# Patient Record
Sex: FEMALE | Race: WHITE
[De-identification: names, ages, dates, MRNs, and addresses within clinical notes are randomized per-mention and may not be internally consistent; named-entity substitution may affect disease eponyms.]

---

## 2019-06-19 ENCOUNTER — HOSPITAL ENCOUNTER (EMERGENCY)
Dept: HOSPITAL 25 - UCEAST | Age: 54
Discharge: HOME | End: 2019-06-19
Payer: COMMERCIAL

## 2019-06-19 VITALS — SYSTOLIC BLOOD PRESSURE: 140 MMHG | DIASTOLIC BLOOD PRESSURE: 97 MMHG

## 2019-06-19 DIAGNOSIS — R53.81: ICD-10-CM

## 2019-06-19 DIAGNOSIS — J02.9: Primary | ICD-10-CM

## 2019-06-19 DIAGNOSIS — R50.9: ICD-10-CM

## 2019-06-19 PROCEDURE — 99211 OFF/OP EST MAY X REQ PHY/QHP: CPT

## 2019-06-19 PROCEDURE — G0463 HOSPITAL OUTPT CLINIC VISIT: HCPCS

## 2019-06-19 PROCEDURE — 87651 STREP A DNA AMP PROBE: CPT

## 2019-06-19 NOTE — UC
Throat Pain/Nasal [place] HPI





- HPI Summary


HPI Summary: 





Pt presents with c/o sudden onset of ST, fever, malaise X 2 days. 





- History of Current Complaint


Chief Complaint: UCRespiratory


Stated Complaint: SORE THROAT


Time Seen by Provider: 06/19/19 12:58


Hx Obtained From: Patient


Hx Last Menstrual Period: ablasion 2005


Pregnant?: No


Onset/Duration: Sudden Onset, Lasting Days, Still Present, Worse Since - onset


Severity: Moderate


Pain Intensity: 4


Pain Scale Used: 0-10 Numeric


Cough: None


Associated Signs & Symptoms: Positive: Dysphagia, Fever





- Epiglottits Risk Factors


Epiglottis Risk Factors: Sudden Onset





- Allergies/Home Medications


Allergies/Adverse Reactions: 


 Allergies











Allergy/AdvReac Type Severity Reaction Status Date / Time


 


No Known Allergies Allergy   Verified 06/19/19 12:35











Home Medications: 


 Home Medications





Gabapentin CAP(*) [Neurontin 100 mg CAP(*)] 100 mg PO DAILY 06/19/19 [History 

Confirmed 06/19/19]


Ibuprofen 400 mg PO 06/19/19 [History]











PMH/Surg Hx/FS Hx/Imm Hx


Previously Healthy: Yes


Other History Of: 


   Negative For: HIV, Hepatitis B, Hepatitis C





- Surgical History


Surgical History: Yes


Surgery Procedure, Year, and Place: left LOWER EYELID - COSMETIC.  UTERINE 

ABLATION





- Family History


Known Family History: Positive: Cardiac Disease





- Social History


Occupation: Employed Full-time


Lives: With Family


Alcohol Use: Weekly


Substance Use Type: None


Smoking Status (MU): Never Smoked Tobacco


Have You Smoked in the Last Year: No





- Immunization History


Vaccination Up to Date: Yes





Review of Systems


All Other Systems Reviewed And Are Negative: Yes


Constitutional: Positive: Negative


Skin: Positive: Negative


Eyes: Positive: Negative


ENT: Positive: Sore Throat


Respiratory: Positive: Negative


Cardiovascular: Positive: Negative


Gastrointestinal: Positive: Negative


Genitourinary: Positive: Negative


Motor: Positive: Negative


Neurovascular: Positive: Negative


Musculoskeletal: Positive: Myalgia


Neurological: Positive: Negative


Psychological: Positive: Negative


Is Patient Immunocompromised?: No





Physical Exam


Triage Information Reviewed: Yes


Appearance: Ill-Appearing


Vital Signs: 


 Initial Vital Signs











Temp  100.3 F   06/19/19 12:30


 


Pulse  80   06/19/19 12:30


 


Resp  18   06/19/19 12:30


 


BP  140/97   06/19/19 12:30


 


Pulse Ox  97   06/19/19 12:30











Vital Signs Reviewed: Yes


Eye Exam: Normal


ENT: Positive: Pharyngeal erythema


Dental Exam: Normal


Neck exam: Normal


Respiratory Exam: Normal


Cardiovascular Exam: Normal


Musculoskeletal Exam: Normal


Neurological Exam: Normal


Psychological Exam: Normal


Skin Exam: Normal





Throat Pain/Nasal Course/Dx





- Differential Dx/Diagnosis


Differential Diagnosis/HQI/PQRI: Pharyngitis, Tonsillitis


Provider Diagnosis: 


 Sore throat (viral)








Discharge





- Sign-Out/Discharge


Documenting (check all that apply): Patient Departure


All imaging exams completed and their final reports reviewed: No Studies





- Discharge Plan


Condition: Stable


Disposition: HOME


Patient Education Materials:  Pharyngitis (ED), Viral Syndrome (ED)


Referrals: 


Josiah Painting MD [Primary Care Provider] - If Needed


Additional Instructions: 


Please follow up with your PCP as needed. If your symptoms do not improve or 

they worsen, please seek care at the closest emergency room as soon as possible





- Billing Disposition and Condition


Condition: STABLE


Disposition: Home

## 2019-07-08 ENCOUNTER — HOSPITAL ENCOUNTER (EMERGENCY)
Dept: HOSPITAL 25 - UCEAST | Age: 54
Discharge: HOME | End: 2019-07-08
Payer: COMMERCIAL

## 2019-07-08 VITALS — SYSTOLIC BLOOD PRESSURE: 129 MMHG | DIASTOLIC BLOOD PRESSURE: 82 MMHG

## 2019-07-08 DIAGNOSIS — J01.90: Primary | ICD-10-CM

## 2019-07-08 PROCEDURE — 99212 OFFICE O/P EST SF 10 MIN: CPT

## 2019-07-08 PROCEDURE — G0463 HOSPITAL OUTPT CLINIC VISIT: HCPCS

## 2019-07-08 NOTE — XMS REPORT
Continuity of Care Document (CCD)

 Created on:2019



Patient:Celia Braga

Sex:Female

:1965

External Reference #:[id number]





Demographics







 Address  [address] [place] [address]

 

 Home Phone  [phone]

 

 Mobile Phone  [phone]

 

 Work Phone  [phone]

 

 Email Address  [email or web address]

 

 Preferred Language  en

 

 Marital Status  Not  or 

 

 Anabaptist Affiliation  Unknown

 

 Race  White

 

 Ethnic Group  Not  or 









Author







 Name  Josiah Painting M.D.

 

 Address  [address]  [facility]



   Unavailable



   [place] [address]









Support







 Name  Relationship  Address  Phone

 

 Ryan Braga    Unavailable  Unavailable









Care Team Providers







 Name  Role  Phone

 

 Josiah Painting M.D.  Care Team Information   Unavailable









Payers







 Date  Identification Numbers  Payment Provider  Subscriber

 

 Effective: 2018  Policy Number: [id number]  Luana VILLA/ALYCIA  Ryan Braga









 Group Name: [facility]  [address] [address]

 

 PayID: [id number]  [place] [address]







Problems







 Description

 

 No Active Problems







Family History







 Date  Family Member(s)  Observation  Comments

 

   Father  Alzheimer's Disease  

 

   Mother  No Current Problems  

 

   First Brother  No Current Problems  

 

   First Sister  No Current Problems  







Social History







 Type  Date  Description  Comments

 

 Birth Sex    Unknown  

 

 Marital Status    Has been  1 time  

 

 Lives With    Spouse  

 

 Lives With    Children  2

 

 Occupation      

 

 ETOH Use    Occasionally consumes alcohol  

 

 Tobacco Use  Start: Unknown End:  Patient is a former smoker  



   Unknown    

 

 Recreational Drug Use    Never Used Drugs  

 

 Smoking Status  Reviewed: 19  Patient is a former smoker  

 

 Sun Exposure    Uses sunscreen  

 

 Seat Belt/Car Seat    Always uses seat belt  







Allergies, Adverse Reactions, Alerts







 Active Allergies  Reaction  Severity  Comments  Date

 

 NKDA        2011

 

 Seasonal        2019







Medications







 Active Medications  SIG  Qnty  Indications  Ordering  Date



         Provider  

 

 Gabapentin  1 QHS  90caps    Josiah Painting  2019



          100mg        FERNANDA MARTINI  



 Capsules          



           

 

 Citalopram  Take One Tablet  30tabs  F41.9  Josiah Painting  2019



 Hydrobromide  By Mouth Every      FERNANDA MARTINI  



            20mg  Day        



 Tablets          



           

 

 Symbicort  2 puff twice a  10.200gm    Josiah Painting  2018



   day      FERNANDA MARTINI  



 160-4.5mcg/Act          



 Aerosol          



           

 

 Aerochamber Plus  use chamber  1units    Josiah Painting  2017



                 Misc        FERNANDA MARTINI  



           

 

 Fluticasone  2 sprays to each  16gm  J30.9  Josiah Painting  2017



 Propionate  nostril qd.      FERNANDA MARTINI  



          50mcg/Act          



 Suspension          



           

 

 Proair HFA  2 puffs every 4  1units    Josiah Painting  2017



          108(90Base)  hours as needed      FERNANDA MARTINI  



 mcg/Act Aerosol  wheeze        



           









 History Medications









 Amoxicillin  1 tab by mouth  20tabs  J01.90  Josiah Painting  2018 -



           875mg  twice a day      FERNANDA MARTINI  2018



 Tablets          



           

 

 Prednisone  2 tablets by  qs  R06.2  Josiah Painting  2017 -



          20mg Tablets  mouth for 5      FERNANDA MARTINI  2017



   days        

 

 Prednisone  3 tab by mouth  18tabs    Josiah Painting  2017 -



          20mg Tablets  every day for 3      FERNANAD MARTINI  2017



   days then 2 by        



   mouth daily for        



   3 days then 1        



   by mouth daily        



   for 3 days        

 

 Celexa  take one tablet  30tabs  F41.9  Josiah Painting  2016 -



      20mg Tablets  by mouth every      FERNANDA MARTINI  2019



   day        

 

 Amoxicillin  1 by mouth  30tabs    Josiah Painting  10/14/2016 -



           500mg  three times a      FERNANDA MARTINI  10/24/2016



 Tablets  day        



           

 

 Cipro  1 by mouth  20tabs    Josiah Painting  2016 -



     500mg Tablets  twice a day      FERNANDA MARTINI  2016



           

 

 Cephalexin  1 by mouth  20tabs    Josiah Painting  2016 -



          500mg  twice a day      FERNANDA MARTINI  2016



 Tablets          



           

 

 Citalopram  1 by mouth  30tabs  F41.9  Josiah Painting  2016 -



 Hydrobromide  every day      FERNANDA MARTINI  2016



            20mg          



 Tablets          



           

 

 Trazodone HCL  1/2 at bedtime  30tabs  F41.9  Josiah Painting  2016 -



             150mg        FERNANDA MARTINI  2016



 Tablets          



           

 

 Cymbalta  1 by mouth  30caps  F41.9  Josiah Painting  2016 -



        30mg Caps   every day      FERNANDA MARTINI  2016



 Part          

 

 No Active Medications        Unknown  2016 -



           2016

 

 Ciprodex  3 drops tid  1bottle  380.22  Josiah Painting  2013 -



        0.3-0.1%        FERNANDA MARTINI  2016



 Suspension          



           

 

 Amoxicillin/Potassium  1 tab by mouth  20tabs    Josiah Painting  2013 -



 Clavulanate  twice a day x      FERNANDA MARTINI  2016



           875-125mg  10 days from ER        



 Tablets  [facility]        

 

 Albuterol  2 puffs po prn      Josiah Painting  2013 -



         FERNANDA MARTINI  2016

 

 Meloxicam  1 po qd  30tabs    Josiah Painting  2013 -



         15mg Tablets        FERNANDA MARTINI  2013



           

 

 Anusol-HC  apply tid  45gm  569.42  Josiah Painting  2012 -



         2.5% Cream        FERNANDA MARTINI  2013



           

 

 Ciprofloxacin HCL  1 po bid  6tabs  595.0  Josiah Painting  10/25/2011 -



                 250mg        FERNANDA MARTINI  10/28/2011



 Tablets          



           

 

 Naprosyn  1 po bid  60tabs    Josiah Painting  10/12/2011 -



        500mg Tablets        FERNANDA MARTINI  2012



           

 

 Omeprazole  1 po qd  30caps    Josiah Painting  10/12/2011 -



          20mg        FERNANDA MARTINI  2012



 Capsules DR Lloyd  Take 1 Tablet  30taJosiah Lunsford  2011 -



      20mg Tablets  Daily.      FERNANDA MARTINI  2012



           

 

 Symbicort  2 puff bid prn  1units    Josiah Painting   -



         FERNANDA MARTINI  2016



 160-4.5mcg/Act          



 Aerosol          



           

 

 Baclofen  take 1 tablets      Unknown   -



        10mg Tablets  by mouth daily        2016



           

 

 Naproxen  1 by mouth      Unknown   -



        500mg Tablets  twice a day        2016



           

 

 Lorazepam  1 tab by mouth      Unknown   -



         1mg Tablets  three times a        2016



   day as needed        

 

 Motrin Ib  as needed      Unknown   -



         200mg Tablets          10/11/2016



           

 

 Gabapentin  2 hs      Unknown   -



          300mg          2019



 Capsules          



           

 

 Pantoprazole Sodium  1 by mouth      Unknown   -



   every day        10/11/2016



 40mg Tablets DR          



           

 

 Symbicort  2 puffs in      [place], Taniya   -



         80-4.5mcg/Act  morning      M., MPA  2018



 Aerosol          



           







Immunizations







 CPT Code  Status  Date  Vaccine  Lot #

 

 [id number]  Given  2013  Adacel-DTaP 11-64 Yrs Of Age  [id number]







Vital Signs







 Date  Vital  Result  Comment

 

 2019  4:20pm  Weight  137.00 lb  









 BP Systolic  130 mmHg  

 

 BP Diastolic  80 mmHg  

 

 Body Temperature  97.9 F  

 

 Height  62.50 inches  5'2.50"

 

 BMI (Body Mass Index)  24.7 kg/m2  









 2019 10:55am  Weight  134.00 lb  









 BP Systolic  120 mmHg  

 

 BP Diastolic  70 mmHg  

 

 Body Temperature  97.7 F  

 

 Height  62.50 inches  5'2.50"

 

 BMI (Body Mass Index)  24.1 kg/m2  









 2018  8:58am  Weight  136.00 lb  









 BP Systolic  130 mmHg  

 

 BP Diastolic  80 mmHg  

 

 Body Temperature  98.1 F  

 

 Height  62.50 inches  5'2.50"

 

 BMI (Body Mass Index)  24.5 kg/m2  

 

 Left Visual Acuity Distance  20/20  mono vision color nl

 

 Last Menstrual Period  [id number]  Pt. had ablasion  Last pap 

 

   2  

 

 Parity  2  









 2018  8:50am  Weight  138.00 lb  









 BP Systolic  120 mmHg  

 

 BP Diastolic  72 mmHg  

 

 Body Temperature  97.8 F  

 

 Height  64 inches  5'4"

 

 BMI (Body Mass Index)  23.7 kg/m2  









 2017  4:38pm  Weight  132.00 lb  









 BP Systolic  130 mmHg  

 

 BP Diastolic  70 mmHg  

 

 Body Temperature  97.8 F  

 

 Height  64 inches  5'4"

 

 BMI (Body Mass Index)  22.7 kg/m2  









 2017  1:33pm  Weight  134.00 lb  









 BP Systolic  140 mmHg  

 

 BP Diastolic  80 mmHg  

 

 Body Temperature  98.2 F  

 

 Height  64 inches  5'4"

 

 BMI (Body Mass Index)  23.0 kg/m2  









 2017  4:27pm  Weight  135.00 lb  









 BP Systolic  110 mmHg  

 

 BP Diastolic  66 mmHg  

 

 Body Temperature  98.0 F  

 

 Height  64 inches  5'4"

 

 BMI (Body Mass Index)  23.2 kg/m2  









 2017  4:28pm  Weight  137.00 lb  









 BP Systolic  128 mmHg  

 

 BP Diastolic  70 mmHg  

 

 Body Temperature  97.9 F  

 

 Height  64 inches  5'4"

 

 BMI (Body Mass Index)  23.5 kg/m2  









 10/11/2016  3:16pm  Weight  135.00 lb  









 BP Systolic  112 mmHg  

 

 BP Diastolic  80 mmHg  

 

 Body Temperature  98.2 F  

 

 Height  64 inches  5'4"

 

 BMI (Body Mass Index)  23.2 kg/m2  









 2016 10:25am  Weight  130.00 lb  









 BP Systolic  112 mmHg  

 

 BP Diastolic  70 mmHg  

 

 Body Temperature  98.5 F  

 

 Height  64 inches  5'4"

 

 BMI (Body Mass Index)  22.3 kg/m2  









 2016  8:17am  Weight  130.00 lb  









 BP Systolic  130 mmHg  

 

 BP Diastolic  80 mmHg  

 

 Body Temperature  98.4 F  

 

 Height  64 inches  5'4"

 

 BMI (Body Mass Index)  22.3 kg/m2  









 2016  4:42pm  Weight  135.00 lb  









 BP Systolic  140 mmHg  

 

 BP Diastolic  80 mmHg  

 

 Body Temperature  98.9 F  

 

 Height  64 inches  5'4"

 

 BMI (Body Mass Index)  23.2 kg/m2  









 2016  9:06am  Weight  138.00 lb  









 BP Systolic  150 mmHg  

 

 BP Diastolic  100 mmHg  

 

 Body Temperature  98.5 F  

 

 Height  64 inches  5'4"

 

 BMI (Body Mass Index)  23.7 kg/m2  









 2013  9:58am  Weight  141.50 lb  









 BP Systolic  110 mmHg  

 

 BP Diastolic  70 mmHg  

 

 Body Temperature  98.4 F  

 

 Height  64 inches  5'4"

 

 Heart Rate  60 /min  

 

 BMI (Body Mass Index)  24.3 kg/m2  

 

   2  

 

 Parity  2  









 2012  4:32pm  Weight  146.00 lb  









 BP Systolic  122 mmHg  

 

 BP Diastolic  70 mmHg  

 

 Body Temperature  98.0 F  

 

 Height  64 inches  5'4"

 

 BMI (Body Mass Index)  25.1 kg/m2  









 2012  4:10pm  Weight  136.00 lb  









 BP Systolic  122 mmHg  

 

 BP Diastolic  70 mmHg  

 

 Body Temperature  97.3 F  

 

 Height  64 inches  5'4"

 

 BMI (Body Mass Index)  23.3 kg/m2  









 10/25/2011  2:53pm  Weight  136.00 lb  









 BP Systolic  132 mmHg  

 

 BP Diastolic  70 mmHg  

 

 Body Temperature  97.8 F  









 2011  4:41pm  Weight  143.00 lb  









 BP Systolic  112 mmHg  

 

 BP Diastolic  70 mmHg  

 

 Body Temperature  98.3 F  







Results







 Test  Date  Facility  Test  Result  H/L  Range  Note

 

 FSH/LH Evaluation  2019  [facility]  FSH @  51.0 mIU/mL      1









 LH @  37.4 mIU/mL      2









 CBC With Diff  2018  [facility]  WBC  4.8 10*3/uL    (4.1-11.0)  









 RBC  4.69 10*6/uL    (4.00-5.40)  

 

 HGB  14.5 g/dL    (12.0-16.0)  

 

 HCT  42.5 %    (36.0-47.0)  

 

 MCV  90.7 fL    (80.0-95.0)  

 

 MCH  31.0 pg    (27.0-32.0)  

 

 MCHC  34.2 g/dL    (32.0-36.0)  

 

 RDW  13.5 %    (10.5-14.5)  

 

 PLT  215 10*3/uL    (150-450)  

 

 MPV  9.7 fL    (7.1-10.7)  

 

 Neut %  61.2 %    (35.0-75.0)  

 

 Lymph %  25.2 %    (16.0-52.0)  

 

 Mono %  9.8 %  High  (0.0-8.0)  

 

 Eos %  2.8 %    (0.0-5.0)  

 

 Baso %  1.0 %    (0.0-4.0)  

 

 Neut #  2.9 10*3/uL    (1.8-7.7)  

 

 Lymph #  1.2 10*3/uL    (1.2-4.8)  

 

 Mono #  0.5 10*3/uL    (0.0-0.8)  

 

 Eos #  0.1 10*3/uL    (0.0-0.5)  

 

 Baso #  0.0 10*3/uL    (0.0-0.2)  









 CMP  2018  [facility]  Sodium  140 mmol/L    (136-145)  









 Potassium  4.2 mmol/L    (3.6-5.2)  

 

 Chloride  103 mmol/L    (100-108)  

 

 Co2  30 mmol/L    (22-31)  

 

 Anion Gap  7 mmol/L    (7-16)  

 

 Urea Nitrogen  10 mg/dL    (7-24)  

 

 Creatinine  0.84 mg/dL    (0.60-1.00)  

 

 BUN/Creat Ratio  11.9 RATIO    (10.0-20.0)  

 

 Glucose  91 mg/dL    (70-99)  3

 

 Calcium  8.6 mg/dL    (8.4-10.2)  

 

 Total Protein  7.0 g/dL    (6.4-8.2)  

 

 Albumin  4.1 g/dL    (3.5-4.6)  

 

 Globulin  2.9 g/dL    (2.7-4.3)  

 

 Alb/Glob Ratio  1.4 RATIO      

 

 Alkaline Phosphatase  69 U/L    ()  

 

 Bilirubin,Total  0.8 mg/dL    (0.0-1.0)  

 

 Ast (Sgot)  15 U/L    (11-39)  

 

 Alt (SGPT)  22 U/L    (12-78)  

 

 GFR  >60 ml/min/1.73m2    (>59)  

 

 GFR ( Amer)  >60 ml/min/1.73m2    (>59)  

 

 GFR Interpretation  <SEE NOTE>      4









 Lipid  2018  [facility]  Cholesterol @  260 mg/dL  High  (0-200)  









 Triglyceride @  63 mg/dL    ()  5

 

 HDL Cholesterol @  95 mg/dL    (>40)  6

 

 Chol/HDL Ratio  2.7 RATIO      7

 

 LDL Chol (Calc)  152 mg/dL  High  (<130)  8









 Thyroid  2018  [facility]  TSH,Ultrasensitive @  1.600    (0.360-4.170
)  



 [place] @        mIU/L      

 

 Laboratory  2016  [facility]  Urine Culture  SPECIMEN      9



 test finding        DESCRI>      

 

 Laboratory  2016  [facility]  Urine Culture  SPECIMEN      10



 test finding        DESCRI>      

 

 Poc Troponin  2016  [facility]  Poc Ctni  0.00 ng/mL    (0.00-0.10)  11

 

 Poc Chem8  2016  [facility]  Poc Sodium  140 mmol/L    (136-145)  









 Poc Potassium  3.3 mmol/L  Low  (3.6-5.2)  

 

 Poc Chloride  100 mmol/L    (100-108)  

 

 Poc Co2  25 mmol/L    (22-31)  

 

 Poc Anion Gap  15 mmol/L    (7-16)  

 

 Poc Ionized Calcium  1.25 mmol/L    (1.16-1.32)  12

 

 Poc Glu  115 mg/dL  High  (70-99)  

 

 Poc BUN  7 mg/dL    (7-24)  

 

 Poc Creatinine  0.7 mg/dL    (0.6-1.0)  13

 

 Poc HCT  43 %    (38-47)  

 

 Poc HGB  14.6 g/dL    (12.0-16.0)  









 CBC With Diff  2016  [facility]  WBC  7.3 10*3/uL    (4.1-11.0)  









 RBC  4.93 10*6/uL    (4.00-5.40)  

 

 HGB  14.8 g/dL    (12.0-16.0)  

 

 HCT  44.3 %    (36.0-47.0)  

 

 MCV  90.0 fL    (80.0-95.0)  

 

 MCH  30.1 pg    (27.0-32.0)  

 

 MCHC  33.4 g/dL    (32.0-36.0)  

 

 RDW  13.2 %    (10.5-14.5)  

 

 PLT  UNABLE TO REPORT <SEE NOTE> 10*3/uL    (150-450)  14

 

 MPV  PENDING fL    (7.1-10.7)  

 

 Neut %  54.0 %    (35.0-75.0)  

 

 Lymph %  36.0 %    (16.0-52.0)  

 

 Mono %  8.0 %    (0.0-8.0)  

 

 Eos %  2.0 %    (0.0-5.0)  

 

 Neut #  3.9 10*3/uL    (1.8-7.7)  

 

 Lymph #  2.6 10*3/uL    (1.2-4.8)  

 

 Mono #  0.6 10*3/uL    (0.0-0.8)  

 

 Eos #  0.1 10*3/uL    (0.0-0.5)  









 Hepatic Function  2016  [facility]  Total Protein  7.1 g/dL    (6.4-8.2)  









 Albumin  4.2 g/dL    (3.5-4.6)  

 

 Globulin  2.9 g/dL    (2.7-4.3)  

 

 Alb/Glob Ratio  1.4 RATIO      

 

 Bilirubin,Total  0.6 mg/dL    (0.0-1.0)  

 

 Bilirubin,Conjugated  0.1 mg/dL    (0.0-0.3)  

 

 Bilirubin,Unconj.  0.5 mg/dL    (0.0-0.7)  

 

 Alkaline Phosphatase  52 U/L    ()  

 

 Ast (Sgot)  11 U/L    (11-39)  

 

 Alt (SGPT)  18 U/L    (12-78)  









 Laboratory test  2016  [facility]  Lipase  175 U/L    ()  



 finding              

 

 Laboratory test  2016  [facility]  TSH,Ultrasensitiv  1.013 mIU/L    (0.360-
4.170)  15



 finding      e @        









 Vitamin B12 @  334 pg/mL    (193-986)  









 CKMB Panel  2016  [facility]  CK  37 U/L    ()  16

 

 Troponin I  2016  [facility]  Troponin I  <0.06 ng/mL    (0.00-0.10)  17

 

 Urinalysis  2016  [facility]  Color  YELLOW      18









 Appearance  CLEAR      

 

 Spec Grav Urine  1.012    (1.003-1.030)  

 

 PH Urine  7.5    (5.0-7.5)  

 

 Leuk Esterase  NEGATIVE    (Neg)  

 

 Nitrite Urine  NEGATIVE    (Neg)  

 

 Protein Urine  NEGATIVE    (Neg)  

 

 Glucose Urine  NEGATIVE    (Neg)  

 

 Ketone Urine  1+  Abnormal  (Neg)  

 

 Urobilinogen  0.2 mg/dL    (0-1.0)  

 

 Bilirubin Urine  NEGATIVE    (Neg)  

 

 Blood/HGB Urine  NEGATIVE    (Neg)  









 CMP  2016  [facility]  Sodium  138 mmol/L    (136-145)  









 Potassium  3.4 mmol/L  Low  (3.6-5.2)  

 

 Chloride  100 mmol/L    (100-108)  

 

 Co2  29 mmol/L    (22-31)  

 

 Anion Gap  9 mmol/L    (7-16)  

 

 Urea Nitrogen  7 mg/dL    (7-24)  

 

 Creatinine  0.74 mg/dL    (0.60-1.00)  

 

 BUN/Creat Ratio  9.5 RATIO  Low  (10.0-20.0)  

 

 Glucose  107 mg/dL  High  (70-99)  

 

 Calcium  9.1 mg/dL    (8.4-10.2)  

 

 Total Protein  7.4 g/dL    (6.4-8.2)  

 

 Albumin  4.5 g/dL    (3.5-4.6)  

 

 Globulin  2.9 g/dL    (2.7-4.3)  

 

 Alb/Glob Ratio  1.6 RATIO      

 

 Alkaline Phosphatase  59 U/L    ()  

 

 Bilirubin,Total  0.6 mg/dL    (0.0-1.0)  

 

 Ast (Sgot)  11 U/L    (11-39)  

 

 Alt (SGPT)  19 U/L    (12-78)  

 

 GFR  >60 ml/min/1.73m2    (>59)  

 

 GFR ( Amer)  >60 ml/min/1.73m2    (>59)  

 

 GFR Interpretation  <SEE NOTE>      19









 Laboratory test finding  2016  [facility]  HCG, Qual. Serum  NEGATIVE    (Neg)  









 Lipase  162 U/L    ()  









 Urinalysis  2016  [facility]  Color  YELLOW      









 Appearance  CLEAR      

 

 Spec Grav Urine  1.005    (1.003-1.030)  

 

 PH Urine  6.5    (5.0-7.5)  

 

 Leuk Esterase  NEGATIVE    (Neg)  

 

 Nitrite Urine  NEGATIVE    (Neg)  

 

 Protein Urine  NEGATIVE    (Neg)  

 

 Glucose Urine  NEGATIVE    (Neg)  

 

 Ketone Urine  1+  Abnormal  (Neg)  

 

 Urobilinogen  0.2 mg/dL    (0-1.0)  

 

 Bilirubin Urine  NEGATIVE    (Neg)  

 

 Blood/HGB Urine  NEGATIVE    (Neg)  









 Laboratory test  2016  [facility]  Urine Culture  SPECIMEN DESCRI>    
  20



 finding              

 

 Laboratory test  2016  [facility]  TSH,Ultrasensit  1.110 mIU/L    (
0.360-  



 finding      peace @      4.170)  









 Vitamin B12 @  347 pg/mL    (193-986)  









 Urinalysis  2016  [facility]  Color  YELLOW      









 Appearance  CLEAR      

 

 Spec Grav Urine  1.008    (1.003-1.030)  

 

 PH Urine  5.5    (5.0-7.5)  

 

 Leuk Esterase  NEGATIVE    (Neg)  

 

 Nitrite Urine  NEGATIVE    (Neg)  

 

 Protein Urine  NEGATIVE    (Neg)  

 

 Glucose Urine  NEGATIVE    (Neg)  

 

 Ketone Urine  1+  Abnormal  (Neg)  

 

 Urobilinogen  0.2 mg/dL    (0-1.0)  

 

 Bilirubin Urine  NEGATIVE    (Neg)  

 

 Blood/HGB Urine  NEGATIVE    (Neg)  









 Urinalysis  2016  [facility]  Color  YELLOW      









 Appearance  CLEAR      

 

 Spec Grav Urine  1.025    (1.003-1.030)  

 

 PH Urine  6.0    (5.0-7.5)  

 

 Leuk Esterase  2+  Abnormal  (Neg)  

 

 Nitrite Urine  NEGATIVE    (Neg)  

 

 Protein Urine  NEGATIVE    (Neg)  

 

 Glucose Urine  NEGATIVE    (Neg)  

 

 Ketone Urine  2+  Abnormal  (Neg)  

 

 Urobilinogen  0.2 mg/dL    (0-1.0)  

 

 Bilirubin Urine  2+  Abnormal  (Neg)  

 

 Blood/HGB Urine  NEGATIVE    (Neg)  









 Laboratory test finding  2016  [facility]  Bilirubin Urine  POSITIVE  Abnormal  
(Neg)  

 

 Urine Mocro Only  2016  [facility]  Urine WBC  * 10-25 [HPF]    (0-5)  









 Urine RBC  NONE SEEN [HPF]    (0-2)  

 

 Epithelial Cells  1+ [HPF]      









 CBC With Diff  2016  [facility]  WBC  7.0 10*3/uL    (4.1-11.0)  









 RBC  4.63 10*6/uL    (4.00-5.40)  

 

 HGB  13.9 g/dL    (12.0-16.0)  

 

 HCT  41.5 %    (36.0-47.0)  

 

 MCV  89.6 fL    (80.0-95.0)  

 

 MCH  30.1 pg    (27.0-32.0)  

 

 MCHC  33.5 g/dL    (32.0-36.0)  

 

 RDW  13.4 %    (10.5-14.5)  

 

 PLT  196 10*3/uL    (150-450)  

 

 MPV  9.5 fL    (7.1-10.7)  

 

 Neut %  83.4 %  High  (35.0-75.0)  

 

 Lymph %  11.0 %  Low  (16.0-52.0)  

 

 Mono %  4.4 %    (0.0-8.0)  

 

 Eos %  0.3 %    (0.0-5.0)  

 

 Baso %  0.9 %    (0.0-4.0)  

 

 Neut #  5.8 10*3/uL    (1.8-7.7)  

 

 Lymph #  0.8 10*3/uL  Low  (1.2-4.8)  

 

 Mono #  0.3 10*3/uL    (0.0-0.8)  

 

 Eos #  0.0 10*3/uL    (0.0-0.5)  

 

 Baso #  0.1 10*3/uL    (0.0-0.2)  









 CMP  2016  [facility]  Sodium  137 mmol/L    (136-145)  









 Potassium  3.9 mmol/L    (3.6-5.2)  

 

 Chloride  102 mmol/L    (100-108)  

 

 Co2  30 mmol/L    (22-31)  

 

 Anion Gap  5 mmol/L  Low  (7-16)  

 

 Urea Nitrogen  6 mg/dL  Low  (7-24)  

 

 Creatinine  0.73 mg/dL    (0.60-1.00)  

 

 BUN/Creat Ratio  8.2 RATIO  Low  (10.0-20.0)  

 

 Glucose  121 mg/dL  High  (70-99)  

 

 Calcium  8.8 mg/dL    (8.4-10.2)  

 

 Total Protein  6.7 g/dL    (6.4-8.2)  

 

 Albumin  4.0 g/dL    (3.5-4.6)  

 

 Globulin  2.7 g/dL    (2.7-4.3)  

 

 Alb/Glob Ratio  1.5 RATIO      

 

 Alkaline Phosphatase  60 U/L    ()  

 

 Bilirubin,Total  0.8 mg/dL    (0.0-1.0)  

 

 Ast (Sgot)  14 U/L    (11-39)  

 

 Alt (SGPT)  19 U/L    (12-78)  

 

 GFR  >60 ml/min/1.73m2    (>59)  

 

 GFR ( Amer)  >60 ml/min/1.73m2    (>59)  

 

 GFR Interpretation  <SEE NOTE>      21









 Laboratory test finding  10/25/2011  [facility]  Urine Culture  No growth.      









 1  [facility] Reference Range:



   Males             0.7 - 10.8 mIU/mL



   Females, Menstruating



    Follicular Phase 2.3 - 12.6 mIU/mL



    Mid cycle Peak   5.2 - 17.5 mIU/mL



    Luteal Phase     1.7 - 12.9 mIU/mL



   Females, Postmenopausal



    On Menopausal Hormone



     Therapy (MHT) 5.9 - 72.8 mIU/mL



    Not on MHT     12.7 - 132.2 mIU/mL

 

 2  LH Reference Range:



   Males             1.2 - 10.6 mIU/mL



   Females, Menstruating



    Follicular Phase 1.9 - 26.2 mIU/mL



    Mid cycle Peak   22.8 - 76.1 mIU/mL



    Luteal Phase     0.6 - 16.6 mIU/mL



   Females, Postmenopausal



    On Menopausal Hormone



     Therapy (MHT)   1.1 - 52.4 mIU/mL



    Not on MHT       8.6 - 61.8 mIU/mL

 

 3  FASTING

 

 4  



   ------------------------------------------



   NORMAL KIDNEY FUNCTION



      OR MILD DISEASE       - GFR >OR=60



   CHRONIC KIDNEY DISEASE   - GFR 15 - 59



   RENAL FAILURE            - GFR   <15



   ------------------------------------------



   Est. GFR calculation based on the MDRD



   study equation, which assumes a steady



   state for creatinine.  Est. GFR should not



   be used for medication dosing.

 

 5  FASTING

 

 6  PER NCEP ATP III GUIDELINES:



   RESULTS LOWER THAN 40 MG/DL ARE SUGGESTIVE



   OF INCREASED RISK FOR CORONARY ARTERY



   DISEASE.  RESULTS > OR=TO 60 MG/DL ARE



   CONSIDERED A NEGATIVE RISK FACTOR.

 

 7  INTERPRETATION OF CHOL-HDL RATIO



   



    CHD RISK        FEMALE      MALE



   VERY HIGH           >8.3       >14.3



   HIGH            5.6- 8.3   6.7- 14.3



   AVERAGE         3.7- 5.6   4.0-  6.7



   BELOW AVERAGE   2.5- 3.7   2.7-  4.0



   PROTECTED           <2.5        <2.7

 

 8  PER NCEP ATP III GUIDELINES:



      OPTIMAL          < 100



    NEAR OPTIMAL     100 - 129



   BORDERLINE HIGH   130 - 159



       HIGH          160 - 189



     VERY HIGH         > 189

 

 9  SPECIMEN DESCRIPTION        URINE, COLLECTION METHOD NOT SPECIFIED



   CULTURE RESULTS             25,000 CFU/ML



                               METHICILLIN RESISTANT STAPH AUREUS ISOLATED.



                               IF NEEDED, ADDITIONAL SUSCEPTIBILITY RESULTS MA



                              Y BE OBTAINED



                               BY CONTACTING THE [facility] ([phone]).



   RESULT(S) CALLED TO AND READ BACK BY RENZO AT [id number] ON [phone].



   



   REPORT STATUS               FINAL 2016



   ORGANISM                    METHICILLIN RESISTANT STAPH AUREUS ISOLATED.



   METHOD                      ANUPAMA



   NITROFURANTOIN             <=16  SUSCEPTIBLE



   LEVOFLOXACIN               >=8  RESISTANT



   OXACILLIN                  >=4  RESISTANT



                               OXACILLIN PREDICTS RESULTS FOR ALL CURRENTLY



                               AVAILABLE PENICILLINS,



                               BETA-LACTAM/BETALACTAMASE INHIBITOR



                               COMBINATIONS,CEPHALOSPORINS (WITH THE



                               EXCEPTION OF CEPHALOSPORINS WITH



                               ANTI-MRSA ACTIVITY), AND



                               CARBAPENEMS PER CLSI STANDARDS.



   TETRACYCLINE               <=1  SUSCEPTIBLE



   VANCOMYCIN                 1  SUSCEPTIBLE



   TRIMETH/SULFA              <=.5/9.5  SUSCEPTIBLE



   CIPROFLOXACIN              >=8  RESISTANT



   DOXYCYCLINE                <=0.5  SUSCEPTIBL

 

 10  SPECIMEN DESCRIPTION        URINE, COLLECTION METHOD NOT SPECIFIED



   CULTURE RESULTS             >100,000 CFU/ML  PSEUDOMONAS AERUGINOSA



   REPORT STATUS               FINAL 2016



   ORGANISM                    PSEUDOMONAS AERUGINOSA



   METHOD                      ANUPAMA



   AMIKACIN                   <=2  SUSCEPTIBLE



   CEFEPIME                   4  SUSCEPTIBLE



   CEFTAZIDIME                <=1  SUSCEPTIBLE



   CIPROFLOXACIN              2  INTERMEDIATE



   GENTAMICIN                 2  SUSCEPTIBLE



                               NOTE: PSEUDOMONAS AERUGINOSA IS INTRINSICALLY



                               RESISTANT TO AMOXICILLIN/CLAVULANATE,



                               AMPICILLIN, MOST CEPHALOSPORINS INCLUDING



                               CEFTRIAXONE, TETRACYCLINES, NITROFURANTOIN,



                               AND TRIMETHOPRIM/SULFAMETHOXAZOLE.



   LEVOFLOXACIN               4  INTERMEDIATE



   MEROPENEM                  <=0.25  SUSCEPTIBLE



   PIPERACILLIN/TAZOBACTAM    <=4  SUSCEPTIBLE



   TOBRAMYCIN                 <=1  SUSCEPTIBLE

 

 11  PERFORMED BY [facility] CLINICAL STAFF

 

 12  RESULT IS NOT NORMALIZED TO 7.40

 

 13  PERFORMED BY [facility] CLINICAL STAFF

 

 14  UNABLE TO REPORT PLT CT DUE TO CLUMPING

 

 15  PERFORMED AT [place] [id number]

 

 16  TOTAL CK <56, MASS MB NOT INDICATED.

 

 17  TROPONIN LEVELS TWO TIMES THE UPPER



   LIMIT OF NORMAL ARE MORE PREDICTIVE



   OF MYOCARDIAL INJURY THAN LESSER



   ELEVATIONS. ([facility] 361:9, 2009)

 

 18  PERFORMED AT [place] [address]

 

 19  



   ------------------------------------------



   NORMAL KIDNEY FUNCTION



      OR MILD DISEASE       - GFR >OR=60



   CHRONIC KIDNEY DISEASE   - GFR 15 - 59



   RENAL FAILURE            - GFR   <15



   ------------------------------------------



   Est. GFR calculation based on the MDRD



   study equation, which assumes a steady



   state for creatinine.  Est. GFR should not



   be used for medication dosing.

 

 20  SPECIMEN DESCRIPTION        URINE, COLLECTION METHOD NOT SPECIFIED



   CULTURE RESULTS             >100,000 CFU/ML  ESCHERICHIA COLI



   REPORT STATUS               FINAL 2016



   ORGANISM                    ESCHERICHIA COLI



   METHOD                      ANUPAMA



   AMIKACIN                   <=2  SUSCEPTIBLE



   AMOXICILLIN/CLAVULANIC AC  8/4  SUSCEPTIBLE



   AMPICILLIN                 >=32  RESISTANT



                               ISOLATES SUSCEPTIBLE TO AMPICILLIN ARE ALSO



                               SUSCEPTIBLE TO AMOXICILLIN.



   CEFAZOLIN                  <=4  SUSCEPTIBLE



                               FOR UNCOMPLICATED UTI'S,CEFAZOLIN ANUPAMA RESULTS



                               LESS THAN OR EQUAL TO 16 MCG/ML PREDICT



                               SUSCEPTIBILITY OF THE FOLLOWING ORAL



                               CEPHALOSPORINS:CEFACLOR,CEFDINIR,



                               CEFPODOXIME,CEFPROZIL,CEFUROXIME



                               AND CEPHALEXIN.



   CEFEPIME                   <=1  SUSCEPTIBLE



   CEFOXITIN                  <=4  SUSCEPTIBLE



   CEFTAZIDIME                <=1  SUSCEPTIBLE



   CEFTRIAXONE                <=1  SUSCEPTIBLE



   CIPROFLOXACIN              <=0.25  SUSCEPTIBLE



   GENTAMICIN                 <=1  SUSCEPTIBLE



   LEVOFLOXACIN               <=0.12  SUSCEPTIBLE



   NITROFURANTOIN             <=16  SUSCEPTIBLE



   PIPERACILLIN/TAZOBACTAM    <=4  SUSCEPTIBLE



   TETRACYCLINE               <=1  SUSCEPTIBLE



   TOBRAMYCIN                 <=1  SUSCEPTIBLE



   TRIMETH/SULFA              <=1/19  SUSCEPTIBLE



   ERTAPENEM                  <=0.5  SUSCEPTIBL

 

 21  



   ------------------------------------------



   NORMAL KIDNEY FUNCTION



      OR MILD DISEASE       - GFR >OR=60



   CHRONIC KIDNEY DISEASE   - GFR 15 - 59



   RENAL FAILURE            - GFR   <15



   ------------------------------------------



   Est. GFR calculation based on the MDRD



   study equation, which assumes a steady



   state for creatinine.  Est. GFR should not



   be used for medication dosing.







Procedures







 Date  Code  Description  Status

 

 2019  16506615  Mammogram  Completed

 

 2018  99194  Audiogram/Pure Tone Hearing Test  Completed

 

 2018  [id number]  Mammogram  Completed

 

 2017  42982128  Colonoscopy  Completed







Encounters







 Type  Date  Location  Provider  Dx  Diagnosis

 

 Office Visit  2019  Main Office  Josiah Painting  F41.9  Anxiety disorder
,



   11:00a    FERNANDA MARTINI    unspecified









 N95.1  Menopausal and female climacteric states

 

 Z12.31  Encntr screen mammogram for malignant neoplasm of breast









 Office Visit  2018  9:00a  Main Office  Josiah Painting  Z00.00  Encntr 
for



       FERNANDA MARTINI    general adult



           medical exam w/o



           abnormal findings









 J30.9  Allergic rhinitis, unspecified

 

 F41.9  Anxiety disorder, unspecified









 Office Visit  2018  8:50a  Main Office  Josiah Painting  J01.90  Acute 
sinusitisVINI M.D.    unspecified

 

 Office Visit  2017  4:40p  Main Office  Josiah Painting  F41.9  Anxiety 
disorderVINI M.D.    unspecified

 

 Office Visit  2017  1:30p  Main Office  Taniya Webb  J30.9  Allergic 
rhinitis,



       M., MPA    unspecified









 R06.2  Wheezing









 Office Visit  2017  4:40p  Main Office  Josiah Painting  J20.9  Acute 
bronchitisVINI M.D.    unspecified

 

 Office Visit  2017  4:30p  Main Office  Josiah Painting  J06.9  Acute 
upper



       FERNANDA MARTINI    respiratory



           infection,



           unspecified

 

 Office Visit  10/11/2016  3:20p  Main Office  Josiah Painting  J06.9  Acute 
upper



       FERNANDA MARTINI    respiratory



           infection,



           unspecified

 

 Office Visit  2016 10:30a  Main Office  Josiah Painting  F41.9  Anxiety 
disorderVINI M.D.    unspecified

 

 Office Visit  2016  8:40a  Main Office  Josiah Painting  F41.9  Anxiety 
disorderVINI M.D.    unspecified

 

 Office Visit  2016  5:00p  Main Office  Josiah Painting  F41.9  Anxiety 
disorderVINI M.D.    unspecified

 

 Office Visit  2016  9:10a  Main Office  Josiah Painting  F41.9  Anxiety 
disorderVINI M.D.    unspecified

 

 Office Visit  2013 10:00a  Main Office  Josiah Painting  380.22  Otitis 
Externa



       FERNANDA MARTINI    Other Acute









 V07.9  Prophylactic Measure Unspec









 Office Visit  2012  4:40p  Main Office  Josiah Painting  723.9  Cervical 
Region



       FERNANDA MARTINI    Musculoskeletal



           Disorders & Symptoms



           Unspec









 796.9  Abnormal Findings Other Nonspec









 Office Visit  2012  4:20p  Main Office  Josiah Painting  569.42  Pain 
Anal Or Rectal



       FERNANDA MARTINI    

 

 Office Visit  10/25/2011  2:50p  Main Office  Josiah Painting  595.0  Cystitis 
Acute



       FERNANDA MARTINI    

 

 Office Visit  2011  4:50p  Main Office  Josiah Painting  716.90  
Arthropathy Unspec



       FERNANDA MARTINI    Site Unspec

 

 Office Visit  2009  9:30a  Main Office  Josiah Painting  493.90  Asthma 
Unspec W/O



       FERNANDA MARTINI    Status Asthmaticus









 466.0  Bronchitis Acute









 Office Visit  10/01/2009  2:50p  Main Office  Josiah Painting  461.9  
Sinusitis Acute



       FERNANDA MARTINI    Unspec

 

 Office Visit  2008  4:50p  Main Office  Josiah Painting  727.42  
Ganglion Tendon



       FERNANDA MARTINI    Sheath

 

 Office Visit  2008  3:40p  Main Office  Josiah Painting  847.0  Sprains 
& Strains



       FERNANDA MARTINI    Neck

 

 Office Visit  2008  3:00p  Main Office  Josiah Painting  847.0  Sprains 
& Strains



       FERNANDA MARTINI    Neck









 493.90  Asthma Unspec W/O Status Asthmaticus









 Office Visit  2007  3:30p  Main Office  Josiah Painting  300.00  Anxiety 
State



       FERNANDA MARTINI    Unspec









 311  Depressive Disorder Not Elsewhere Spec









 Office Visit  10/10/2006  3:40p  Main Office  Josiah Painting  311  Depressive 
Disorder



       FERNANDA MARTINI    Not Elsewhere Spec

 

 Office Visit  2006  4:00p  Main Office  Josiah Painting  300.00  Anxiety 
State



       FERNANDA MARTINI    Unspec

 

 Office Visit  2006  2:40p  Main Office  Lorna Hooks  786.52  
Painful Respiration



       NP-C    

 

 Office Visit  2005 11:00a  Main Office  Josiah Painting  719.46  Pain 
Joint Lower



       FERNANDA MARTINI    Leg







Plan of Treatment

2019 - Josiah Painting M.D.K60.2 Anal fissure, unspecifiedReferral:
Paulino Martinez M.D.,

## 2019-07-08 NOTE — UC
Throat Pain/Nasal [place] HPI





- HPI Summary


HPI Summary: 


patient is a 54-year-old female who presents to the urgent care with chief 

complaint of dry cough, chest congestion, sinus pain, postnasal drip, nasal 

yellowish discharge for the last 3 weeks.  Patient reports that she has been 

battling with the symptoms for 3 weeks taking over-the-counter medications.  

However the symptoms are getting worse, therefore, she decided to come to the 

urgent care for further assessment.  She reports chills but no fever.  She 

denies any chest pain shortness of breath or palpitations.  Patient denies any 

sore throat or ear pain.  She has no other complaints.








- History of Current Complaint


Chief Complaint: UCRespiratory


Stated Complaint: URI/CONGESTION


Time Seen by Provider: 07/08/19 11:38


Hx Obtained From: Patient


Hx Last Menstrual Period: ablasion 2005


Pregnant?: No


Onset/Duration: Gradual Onset, Lasting Weeks


Severity: Moderate


Pain Intensity: 6





- Allergies/Home Medications


Allergies/Adverse Reactions: 


 Allergies











Allergy/AdvReac Type Severity Reaction Status Date / Time


 


No Known Allergies Allergy   Verified 07/08/19 11:38











Home Medications: 


 Home Medications





Albuterol HFA INHALER* [Ventolin HFA Inhaler*] 1 puff INH Q4H PRN 07/08/19 [

History Confirmed 07/08/19]


Budesonide/Formote 80/4.5(NF) [Symbicort 80/4.5 (NF)] 1 puff INH BID 07/08/19 [

History Confirmed 07/08/19]


D-Methorphan/PE/Acetaminophen [Vicks Dayquil Liquicaps] 2 cap PO 07/08/19 [

History]











PMH/Surg Hx/FS Hx/Imm Hx


Previously Healthy: Yes


Other History Of: 


   Negative For: HIV, Hepatitis B, Hepatitis C





- Surgical History


Surgical History: Yes


Surgery Procedure, Year, and Place: left LOWER EYELID - COSMETIC.  UTERINE 

ABLATION





- Family History


Known Family History: Positive: Cardiac Disease





- Social History


Alcohol Use: Daily


Substance Use Type: None


Smoking Status (MU): Never Smoked Tobacco


Have You Smoked in the Last Year: No





- Immunization History


Vaccination Up to Date: Yes





Review of Systems


All Other Systems Reviewed And Are Negative: Yes


Constitutional: Positive: Fever, Chills


Skin: Positive: Negative


Eyes: Positive: Negative


ENT: Positive: Sinus Congestion, Sinus Pain/Tenderness


Respiratory: Positive: Cough


Cardiovascular: Positive: Negative


Gastrointestinal: Positive: Negative


Genitourinary: Positive: Negative


Motor: Positive: Negative


Neurovascular: Positive: Negative


Musculoskeletal: Positive: Negative


Neurological: Positive: Negative


Psychological: Positive: Negative


Is Patient Immunocompromised?: No





Physical Exam





- Summary


Physical Exam Summary: 





VITAL SIGNS: Reviewed. 


GENERAL:  Patient is a well developed and nourished female who is lying 

comfortable in the stretcher.  Patient is not in any acute respiratory 

distress. 


HEAD AND FACE: No signs of trauma.  No ecchymosis, hematomas or skull 

depressions. No sinus tenderness. 


EYES: PERRLA, EOMI x 2, No injected conjunctiva, no nystagmus.


EARS: Hearing grossly intact. Ear canals and tympanic membranes are within 

normal limits. 


positive maxillary and ethmoid sinus tenderness.  Positive yellow discharge in 

both nostrils.


MOUTH: Oropharynx within normal limits. 


NECK: Supple, trachea is midline, no adenopathy, no JVD, no carotid bruit, no c-

spine tenderness, neck with full ROM.


CHEST: Symmetric, no tenderness at palpation 


LUNGS: Clear to auscultation bilaterally. No wheezing or crackles.


CVS: Regular rate and rhythm, S1 and S2 present, no murmurs or gallops 

appreciated. 


ABDOMEN: Soft, non-tender. No signs of distention. No rebound no guarding, and 

no masses palpated. Bowel sounds are normal. 


EXTREMITIES: FROM in all major joints, no edema, no cyanosis or clubbing.


NEURO: Alert and oriented x 3. No acute neurological deficits. Speech is normal 

and follows commands. 


SKIN: Dry and warm





Triage Information Reviewed: Yes


Appearance: Well-Appearing


Vital Signs: 


 Initial Vital Signs











Temp  98.8 F   07/08/19 11:35


 


Pulse  82   07/08/19 11:35


 


Resp  18   07/08/19 11:35


 


BP  129/82   07/08/19 11:35


 


Pulse Ox  98   07/08/19 11:35











Vital Signs Reviewed: Yes





Throat Pain/Nasal Course/Dx





- Course


Course Of Treatment: 


he since that the patient has an acute sinusitis.  The patient's symptoms are 

present for more than 3 weeks.  Therefore at this time I would give the patient 

Augmentin and promethazine with codeine for the cough.  Patient will be 

discharged home with follow-up with primary care physician, and she was 

recommended to return to the urgent care or to the emergency room if the 

symptoms worsen.  She understands and agrees.  Patient is hemodynamically 

stable.








- Differential Dx/Diagnosis


Provider Diagnosis: 


 Acute sinusitis








Discharge





- Sign-Out/Discharge


Documenting (check all that apply): Patient Departure


All imaging exams completed and their final reports reviewed: No Studies





- Discharge Plan


Condition: Stable


Disposition: HOME


Prescriptions: 


Amoxicillin/Clavulanate TAB* [Augmentin *] 875 mg PO BID #20 tab


Benzonatate CAP* [Tessalon 100 MG CAP*] 100 mg PO TID PRN #12 cap


 PRN Reason: Cough


Promethazine HCl/Codeine [Promethazine-Codeine Syrup] 10 ml PO TID PRN #120 ml 

MDD 30 ml


 PRN Reason: Cough


Patient Education Materials:  Sinusitis (ED)


Forms:  *Work Release


Referrals: 


Josiah Painting MD [Primary Care Provider] - 


Additional Instructions: 


Take medications as instructed


Increase your fluid intake


F/U with PCP in the next 2-3 days


Return to the  if symptoms worsen








- Billing Disposition and Condition


Condition: STABLE


Disposition: Home

## 2019-07-08 NOTE — XMS REPORT
Continuity of Care Document (CCD)

 Created on:2019



Patient:Celia Braga

Sex:Female

:1965

External Reference #:[id number]





Demographics







 Address  [address] [place] [address]

 

 Home Phone  [phone]

 

 Work Phone  [phone]

 

 Email Address  [email or web address]

 

 Preferred Language  en

 

 Marital Status  Not  or 

 

 Synagogue Affiliation  Unknown

 

 Race  White

 

 Ethnic Group  Not  or 









Author







 Name  ТатьянаAraJackie









Support







 Name  Relationship  Address  Phone

 

 Ryan Braga  Unavailable  Unavailable  [phone]









Care Team Providers







 Name  Role  Phone

 

 Josiah Painting MD  Primary Care Physician  Unavailable









Payers







 Date  Identification Numbers  Payment Provider  Subscriber

 

 Effective: 2018  Policy Number: [id number]  [employer]  Ryan Braga









 PayID: [id number]  [address] [address]









 [place] [address]







Problems







 Active Problems  Provider  Date

 

 Neck pain  Keith Ortiz M.D.  Onset: 2016

 

 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

 

 Skin sensation disturbance  Paulino Cespedes MD  Onset: 2016

 

 Thoracic and lumbosacral neuritis  Paulino Cespedes MD  Onset: 2016







Family History







 Date  Family Member(s)  Observation  Comments

 

   General  Lymphoma  

 

   General  Rheumatoid Arthritis  

 

   General  Colon Polyps  

 

   Father  Lymphoma  

 

   First Sister  Rheumatoid Arthritis  







Social History







 Type  Date  Description  Comments

 

 Birth Sex    Unknown  

 

 Occupation    Currently Working  

 

 Occupation     at [employer]  

 

 Tobacco Use  Start: Unknown End:  Former Cigarette Smoker  



   Unknown    

 

 Smoking Status  Reviewed: 19  Former Cigarette Smoker  

 

 ETOH Use    Occasionally consumes  



     alcohol  

 

 Tobacco Use  Start: Unknown End:  Patient is a former smoker  quit ~



   Unknown    

 

 Recreational Drug Use    Denies Drug Use  







Allergies, Adverse Reactions, Alerts







 Description

 

 No Known Drug Allergies







Medications







 Active Medications  SIG  Qnty  Indications  Ordering Provider  Date

 

 Gabapentin  200mg at night  63caps  R20.2  Paulino Cespedes MD  05/15/2019



          100mg  for a 3 week        



 Capsules  period then        



   100mg for 3        



   weeks then stop        

 

 Celexa  1 by mouth every      Unknown  



      20mg Tablets  day        



           

 

 Multi Complete  daily      Unknown  



           



 Capsules          



           

 

 Albuterol Sulfate  1-2 puffs every      Unknown  



   4 hours as        



 Powder  needed sob        



           

 

 Symbicort  2 puff twice a      Unknown  



         80-4.5mcg/Act  day        



 Aerosol          



           

 

 Align  1 by mouth every      Unknown  



     4mg Capsules  day        



           









 History Medications









 Gabapentin  1 by mouth three    G62.9  Paulino Cespedes,  2017 -



            300mg  times a day      MD  2017



 Capsules          



           

 

 Gabapentin  1 by mouth three    G62.9  Paulino Cespedes,  2017 -



            300mg  times a day      MD  2019



 Capsules          



           

 

 Gabapentin  go to 200mg at  30caps  R20.2  Paulino Cespedes  2017 -



            300mg  night for a 3 week      MD  05/15/2019



 Capsules  period then 100mg        



   for 3 weeks then        



   stop        

 

 Gabapentin  3 at night  120caps  R20.2  Paulino Cespedes  2017 -



            100mg        MD  2017



 Capsules          



           

 

 Gabapentin  Take  2 tablets @  360caps  G62.9  Paulino Cespedes,  2016 -



            100mg  hs      Not taking      MD  2017



 Capsules  right now.        



           

 

 Gabapentin  take 5 caps( 500  320caps  G62.9  Paulino Cespedes,  2016 -



            100mg  mg) at bedtime for      MD  2016



 Capsules  1 week; on 11/10        



   decrease by 1        



   capsule(100 mg )        



   every week on        



    until        



   stopped        

 

 Gabapentin  take 3-4 caps      Whitney Fontanez,  2016 -



            100mg  every night at      NP  2016



 Capsules  bedtime as        



   directed        

 

 Ibuprofen  as needed      Unknown   -



           200mg          2016



 Tablets          



           

 

 Baclofen  take 1/2 tab every      Unknown   -



          10mg  8 hours as needed        2016



 Tablets  for muscle spasm        



           

 

 Ativan  1/2 tab  as needed      Unknown   -



        1mg Tablets  anxiety        2016



           

 

 Gabapentin  1 by mouth every      Paulino Cespedes,   -



            400mg  day      MD  2016



 Capsules          



           

 

 Cephalexin  take 1 capsule by      Unknown   -



            500mg  mouth twice a day        06/15/2016



 Capsules  for 10 days        



   (ending 16)        

 

 Cipro  1 by mouth twice a      Unknown   -



       500mg Tablets  day x 10 days (has        2016



   2 days left)        

 

 Gabapentin    30tabs    Paulino Cespedes   -



            600mg        MD  2016



 Tablets          



           

 

 Cranberry  1 tabs twice daily      Unknown   -



           250mg          2019



 Capsules          



           

 

 Magnesium  1 by mouth every      Unknown   -



           250mg  day        2016



 Tablets          



           







Vital Signs







 Date  Vital  Result  Comment

 

 2019  2:18pm  Height  63 inches  5'3"









 Weight  136.00 lb  

 

 Heart Rate  99 /min  

 

 BP Systolic  147 mmHg  

 

 BP Diastolic  90 mmHg  

 

 O2 % BldC Oximetry  98 %  

 

 BMI (Body Mass Index)  24.1 kg/m2  









 2019  9:11am  Height  63 inches  5'3"









 Weight  135.38 lb  

 

 Heart Rate  72 /min  

 

 BP Systolic  118 mmHg  

 

 BP Diastolic  88 mmHg  

 

 BMI (Body Mass Index)  24.0 kg/m2  









 2017  9:53am  Height  63 inches  5'3"









 Weight  137.00 lb  

 

 Heart Rate  70 /min  

 

 BP Systolic  118 mmHg  

 

 BP Diastolic  76 mmHg  

 

 BMI (Body Mass Index)  24.3 kg/m2  









 2017  9:43am  Height  63 inches  5'3"









 Weight  139.00 lb  

 

 Heart Rate  88 /min  

 

 BP Systolic Sitting  140 mmHg  

 

 BP Diastolic Sitting  90 mmHg  

 

 BMI (Body Mass Index)  24.6 kg/m2  









 2017  9:19am  Height  63 inches  5'3"









 Weight  139.00 lb  

 

 Heart Rate  76 /min  

 

 BP Systolic Sitting  120 mmHg  

 

 BP Diastolic Sitting  80 mmHg  

 

 BMI (Body Mass Index)  24.6 kg/m2  









 2016  9:59am  Height  63 inches  5'3"









 Weight  133.00 lb  

 

 Heart Rate  60 /min  

 

 BP Systolic Sitting  120 mmHg  

 

 BP Diastolic Sitting  70 mmHg  

 

 Respiratory Rate  17 /min  

 

 BMI (Body Mass Index)  23.6 kg/m2  









 2016 10:07am  Height  63 inches  5'3"









 Weight  130.00 lb  

 

 Heart Rate  68 /min  

 

 BP Systolic Sitting  128 mmHg  

 

 BP Diastolic Sitting  76 mmHg  

 

 Respiratory Rate  16 /min  

 

 BMI (Body Mass Index)  23.0 kg/m2  









 2016  1:01pm  Height  63 inches  5'3"









 Weight  130.25 lb  

 

 Heart Rate  80 /min  

 

 BP Systolic Sitting  110 mmHg  

 

 BP Diastolic Sitting  70 mmHg  

 

 Respiratory Rate  17 /min  

 

 BMI (Body Mass Index)  23.1 kg/m2  









 2016  1:07pm  Weight  136.00 lb  









 Heart Rate  76 /min  

 

 BP Systolic Sitting  122 mmHg  

 

 BP Diastolic Sitting  78 mmHg  

 

 Pain Level  3  









 2016  1:45pm  Weight  136.00 lb  









 Heart Rate  82 /min  

 

 BP Systolic Sitting  132 mmHg  

 

 BP Diastolic Sitting  80 mmHg  

 

 Pain Level  2  









 2016  9:40am  Weight  136.00 lb  









 Heart Rate  78 /min  

 

 BP Systolic Sitting  132 mmHg  

 

 BP Diastolic Sitting  88 mmHg  

 

 Pain Level  2  







Procedures







 Date  Code  Description  Status

 

 2017  50523206  Colonoscopy  Completed

 

 2016  54961  Nerve Conduction 05-06 Studies  Completed

 

 2016  03961  Needle Electromyography Complete, Five Or More Muscles  
Completed



     Studied  







Encounters







 Type  Date  Location  Provider  Dx  Diagnosis

 

 Office Visit  2019  [facility]  Paulino Cespedes,  R20.2  
Paresthesia of



   9:15a    MD    skin

 

 Office Visit  2018  [facility]  Trey Verduzco,  D22.62  Melanocytic 
nevi



   3:10p    MD    of left upper



           limb, including



           shoulder









 L30.8  Other specified dermatitis









 Office Visit  2017  [facility]  Paulino  R20.2  Paresthesia of



   9:45a  Clinic  MD Rubina    skin

 

 Office Visit  2017  [facility]urohospital[facility]  Paulino  R20.2  Paresthesia of



   9:15a  Clinic  MD Rubina    skin

 

 Office Visit  2017  [facility]  Paulino  R20.2  Paresthesia of



   9:15a  Clinic  MD Rubina    skin

 

 Office Visit  2016  Neurohospitalist  Paulino  R20.2  Paresthesia of



   10:00a  Clinic  MD Rubina    skin

 

 Office Visit  2016  Neurohospitalist  Paulino  R20.8  Other



   10:00a  Clinic  MD Rubina    disturbances of



           skin sensation









 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

 

 R20.2  Paresthesia of skin

 

 Z79.899  Other long term (current) drug therapy









 Office Visit  2016  [facility]urohospital[facility]  Paulino  R20.8  Other



   1:00p  Clinic  MD Rubina    disturbances of



           skin sensation

 

 Office Visit  2016  [facility]  Keith  G62.9  Polyneuropathy,



   1:15p  Of Qamar Ortiz M.D.    unspecified

 

 Office Visit  2016  [facility]  Keith  M54.2  Cervicalgia



   1:45p  Of Qamar Ortiz M.D.    

 

 Office Visit  2016  [facility]  Keith  M54.2  Cervicalgia



   9:45a  Of Qamar Ortiz M.D.    







Plan of Treatment

Future Appointment(s):2019  8:00 am - Jj Parish MD at [facility] 
Ggeysypjsgecpzdm73/13/2019 - Jj Parish MDK59.00 Constipation, 
unspecifiedFollow up:6-8 grggpM36.5 Hemorrhage of anus and rectumFollow up:6-8 
weeks